# Patient Record
Sex: FEMALE | Race: WHITE | Employment: OTHER | URBAN - METROPOLITAN AREA
[De-identification: names, ages, dates, MRNs, and addresses within clinical notes are randomized per-mention and may not be internally consistent; named-entity substitution may affect disease eponyms.]

---

## 2017-06-14 ENCOUNTER — OFFICE VISIT (OUTPATIENT)
Dept: OBGYN CLINIC | Facility: CLINIC | Age: 52
End: 2017-06-14

## 2017-06-14 VITALS
DIASTOLIC BLOOD PRESSURE: 82 MMHG | SYSTOLIC BLOOD PRESSURE: 118 MMHG | WEIGHT: 156 LBS | HEIGHT: 65 IN | RESPIRATION RATE: 16 BRPM | HEART RATE: 72 BPM | BODY MASS INDEX: 25.99 KG/M2

## 2017-06-14 DIAGNOSIS — Z12.31 ENCOUNTER FOR SCREENING MAMMOGRAM FOR MALIGNANT NEOPLASM OF BREAST: ICD-10-CM

## 2017-06-14 DIAGNOSIS — N39.0 URINARY TRACT INFECTION, SITE UNSPECIFIED: Primary | ICD-10-CM

## 2017-06-14 DIAGNOSIS — Z01.419 ENCOUNTER FOR WELL WOMAN EXAM WITH ROUTINE GYNECOLOGICAL EXAM: ICD-10-CM

## 2017-06-14 PROCEDURE — 87624 HPV HI-RISK TYP POOLED RSLT: CPT | Performed by: OBSTETRICS & GYNECOLOGY

## 2017-06-14 PROCEDURE — 88175 CYTOPATH C/V AUTO FLUID REDO: CPT | Performed by: OBSTETRICS & GYNECOLOGY

## 2017-06-14 PROCEDURE — 87086 URINE CULTURE/COLONY COUNT: CPT | Performed by: OBSTETRICS & GYNECOLOGY

## 2017-06-14 PROCEDURE — 81003 URINALYSIS AUTO W/O SCOPE: CPT | Performed by: OBSTETRICS & GYNECOLOGY

## 2017-06-14 PROCEDURE — 99396 PREV VISIT EST AGE 40-64: CPT | Performed by: OBSTETRICS & GYNECOLOGY

## 2017-06-14 NOTE — PROGRESS NOTES
Yrn Stanford is a 47year old female [id number] Patient's last menstrual period was 11/14/2016. Patient presents with:  Wellness Visit: annual  .  No menses since 11/16, c/o mild hot flashes, declines therapy. Discussed menopause/perimenopause  H/o cone bx vision  Cardiovascular:  denies chest pain or palpitations  Respiratory:  denies shortness of breath  Gastrointestinal:  denies heartburn, abdominal pain, diarrhea or constipation  Genitourinary:  denies dysuria, incontinence, abnormal vaginal discharge, v Risk , Thin Prep Collect; Future  -     anual mammogram; Future  -     CBC With Differential With Platelet; Future  -     Comp Metabolic Panel (14); Future  -     Lipid Panel; Future  -     Assay, Thyroid Stim Hormone;  Future  -     Vitamin D, 25-Hydroxy;

## 2017-08-29 ENCOUNTER — TELEPHONE (OUTPATIENT)
Dept: OBGYN CLINIC | Facility: CLINIC | Age: 52
End: 2017-08-29

## 2017-08-30 NOTE — TELEPHONE ENCOUNTER
Patient states that for the past few months she has been having regular menses and wants to go back on OCP. Patient was on LO Loestrin FE. Will check with Dr. Jaden Cano and call patient back.

## 2017-10-28 ENCOUNTER — NURSE ONLY (OUTPATIENT)
Dept: OBGYN CLINIC | Facility: CLINIC | Age: 52
End: 2017-10-28

## 2017-10-28 DIAGNOSIS — N30.01 ACUTE CYSTITIS WITH HEMATURIA: Primary | ICD-10-CM

## 2017-10-28 PROCEDURE — 81002 URINALYSIS NONAUTO W/O SCOPE: CPT | Performed by: OBSTETRICS & GYNECOLOGY

## 2017-10-28 PROCEDURE — 87086 URINE CULTURE/COLONY COUNT: CPT | Performed by: OBSTETRICS & GYNECOLOGY

## 2017-10-28 PROCEDURE — 87186 SC STD MICRODIL/AGAR DIL: CPT | Performed by: OBSTETRICS & GYNECOLOGY

## 2017-10-28 PROCEDURE — 87077 CULTURE AEROBIC IDENTIFY: CPT | Performed by: OBSTETRICS & GYNECOLOGY

## 2017-10-28 PROCEDURE — 99212 OFFICE O/P EST SF 10 MIN: CPT | Performed by: OBSTETRICS & GYNECOLOGY

## 2017-10-28 RX ORDER — SULFAMETHOXAZOLE AND TRIMETHOPRIM 800; 160 MG/1; MG/1
1 TABLET ORAL 2 TIMES DAILY
Qty: 20 TABLET | Refills: 0 | Status: SHIPPED | OUTPATIENT
Start: 2017-10-28 | End: 2017-11-07

## 2017-11-17 ENCOUNTER — HOSPITAL ENCOUNTER (OUTPATIENT)
Dept: MAMMOGRAPHY | Age: 52
Discharge: HOME | End: 2017-11-17
Attending: OBSTETRICS & GYNECOLOGY
Payer: COMMERCIAL

## 2017-11-17 DIAGNOSIS — Z12.31 ENCOUNTER FOR SCREENING MAMMOGRAM FOR MALIGNANT NEOPLASM OF BREAST: ICD-10-CM

## 2017-11-17 DIAGNOSIS — Z01.419 ENCOUNTER FOR WELL WOMAN EXAM WITH ROUTINE GYNECOLOGICAL EXAM: ICD-10-CM

## 2017-11-17 PROCEDURE — 77067 SCR MAMMO BI INCL CAD: CPT | Performed by: OBSTETRICS & GYNECOLOGY

## 2017-11-21 ENCOUNTER — TELEPHONE (OUTPATIENT)
Dept: OBGYN CLINIC | Facility: CLINIC | Age: 52
End: 2017-11-21

## 2017-11-21 NOTE — TELEPHONE ENCOUNTER
Called and spoke with patient regarding her overdue labs still needing to be done, and pt stated she will come in soon to get the bloodwork done.

## 2019-08-28 ENCOUNTER — OFFICE VISIT (OUTPATIENT)
Dept: OBGYN CLINIC | Facility: CLINIC | Age: 54
End: 2019-08-28
Payer: COMMERCIAL

## 2019-08-28 VITALS
HEIGHT: 65 IN | DIASTOLIC BLOOD PRESSURE: 62 MMHG | BODY MASS INDEX: 24.83 KG/M2 | HEART RATE: 69 BPM | SYSTOLIC BLOOD PRESSURE: 102 MMHG | WEIGHT: 149 LBS

## 2019-08-28 DIAGNOSIS — Z12.39 SCREENING FOR BREAST CANCER: ICD-10-CM

## 2019-08-28 DIAGNOSIS — N95.2 VAGINAL ATROPHY: ICD-10-CM

## 2019-08-28 DIAGNOSIS — N90.89 VULVAR LESION: ICD-10-CM

## 2019-08-28 DIAGNOSIS — Z01.411 ENCOUNTER FOR WELL WOMAN EXAM WITH ABNORMAL FINDINGS: Primary | ICD-10-CM

## 2019-08-28 DIAGNOSIS — R35.0 URINARY FREQUENCY: ICD-10-CM

## 2019-08-28 DIAGNOSIS — N39.3 SUI (STRESS URINARY INCONTINENCE, FEMALE): ICD-10-CM

## 2019-08-28 DIAGNOSIS — N94.10 DYSPAREUNIA IN FEMALE: ICD-10-CM

## 2019-08-28 LAB
APPEARANCE: CLEAR
BILIRUB UR QL STRIP.AUTO: NEGATIVE
CLARITY UR REFRACT.AUTO: CLEAR
COLOR UR AUTO: YELLOW
GLUCOSE UR STRIP.AUTO-MCNC: NEGATIVE MG/DL
KETONES UR STRIP.AUTO-MCNC: NEGATIVE MG/DL
LEUKOCYTE ESTERASE UR QL STRIP.AUTO: NEGATIVE
MULTISTIX LOT#: NORMAL NUMERIC
NITRITE UR QL STRIP.AUTO: NEGATIVE
PH UR STRIP.AUTO: 7 [PH] (ref 4.5–8)
PH, URINE: 7 (ref 4.5–8)
PROT UR STRIP.AUTO-MCNC: NEGATIVE MG/DL
RBC #/AREA URNS AUTO: >10 /HPF
SP GR UR STRIP.AUTO: 1.01 (ref 1–1.03)
SPECIFIC GRAVITY: 1.01 (ref 1–1.03)
URINE-COLOR: YELLOW
UROBILINOGEN UR STRIP.AUTO-MCNC: <2 MG/DL

## 2019-08-28 PROCEDURE — 99213 OFFICE O/P EST LOW 20 MIN: CPT | Performed by: OBSTETRICS & GYNECOLOGY

## 2019-08-28 PROCEDURE — 88175 CYTOPATH C/V AUTO FLUID REDO: CPT | Performed by: OBSTETRICS & GYNECOLOGY

## 2019-08-28 PROCEDURE — 81002 URINALYSIS NONAUTO W/O SCOPE: CPT | Performed by: OBSTETRICS & GYNECOLOGY

## 2019-08-28 PROCEDURE — 99396 PREV VISIT EST AGE 40-64: CPT | Performed by: OBSTETRICS & GYNECOLOGY

## 2019-08-28 PROCEDURE — 87086 URINE CULTURE/COLONY COUNT: CPT | Performed by: OBSTETRICS & GYNECOLOGY

## 2019-08-28 NOTE — H&P
[facility]  Obstetrics and Gynecology   History & Physical  Established Patient    Chief complaint: Patient presents with:  Wellness Visit    Subjective:     HPI: Monty Chaney is a 49year old  with LMP Patient's last menstrual period w 11/08, 05/03    ASC-US HPV +    • Cervical dysplasia     H/o cervical dysplasia, underwent LEEP/Conization.     • E-coli UTI 10/28/2017    Culture proven E.coli UTI   • History of Papanicolaou smear of cervix 07/15, 04/14, 10/12, 10/11, 09/10, 04/07, 04/06, Emotionally abused: Not on file        Physically abused: Not on file        Forced sexual activity: Not on file    Other Topics      Concerns:         Service: Not Asked        Blood Transfusions: Not Asked        Caffeine Concern: No        Occu rhythm. Exam reveals no gallop and no friction rub. No murmur heard. Edema not present. Pulmonary/Chest: Breath sounds normal. She has no wheezes. She has no rales. no palpable masses  Negative nipple discharge or skin changes.  Negative axillary lym recent dyspareunia with vaginal dryness during intercourse.      Patient Active Problem List:     Contusion of left wrist     Acute cystitis with hematuria      Diagnoses and all orders for this visit:    Encounter for well woman exam with abnormal findings vaginal estrogen, Imvexxy 4 mg dose, Rx sent  -reviewed vulvar hygiene, coconut oil, lubricant for all intercourse.      Urinary frequency  -UA in office with moderate blood, will send UA to lab & UCx    Cervical cancer screening   -h/o LEEP in 2009  -ousmane

## 2019-08-30 PROBLEM — N90.89 VULVAR LESION: Status: ACTIVE | Noted: 2019-08-30

## 2019-08-30 PROBLEM — N95.2 VAGINAL ATROPHY: Status: ACTIVE | Noted: 2019-08-30

## 2019-08-30 PROBLEM — N94.10 DYSPAREUNIA IN FEMALE: Status: ACTIVE | Noted: 2019-08-30

## 2019-08-30 PROBLEM — N39.3 SUI (STRESS URINARY INCONTINENCE, FEMALE): Status: ACTIVE | Noted: 2019-08-30

## 2019-08-30 PROBLEM — R35.0 URINARY FREQUENCY: Status: ACTIVE | Noted: 2019-08-30

## 2020-09-14 ENCOUNTER — TELEPHONE (OUTPATIENT)
Dept: OBGYN CLINIC | Facility: CLINIC | Age: 55
End: 2020-09-14

## 2020-09-14 DIAGNOSIS — Z12.31 SCREENING MAMMOGRAM, ENCOUNTER FOR: Primary | ICD-10-CM

## 2020-09-15 NOTE — TELEPHONE ENCOUNTER
Patient aware order placed and routed for signature for her mammogram. CS number given. Patient verbalized understanding.

## 2020-09-28 RX ORDER — ESTRADIOL 4 UG/1
INSERT VAGINAL
Qty: 8 EACH | Refills: 0 | OUTPATIENT
Start: 2020-09-28

## 2020-11-05 ENCOUNTER — OFFICE VISIT (OUTPATIENT)
Dept: OBGYN CLINIC | Facility: CLINIC | Age: 55
End: 2020-11-05
Payer: COMMERCIAL

## 2020-11-05 VITALS
DIASTOLIC BLOOD PRESSURE: 60 MMHG | WEIGHT: 141 LBS | BODY MASS INDEX: 23.49 KG/M2 | SYSTOLIC BLOOD PRESSURE: 120 MMHG | HEIGHT: 65 IN

## 2020-11-05 DIAGNOSIS — Z01.419 ENCOUNTER FOR WELL WOMAN EXAM WITH ROUTINE GYNECOLOGICAL EXAM: Primary | ICD-10-CM

## 2020-11-05 DIAGNOSIS — Z12.4 CERVICAL CANCER SCREENING: ICD-10-CM

## 2020-11-05 PROCEDURE — 81002 URINALYSIS NONAUTO W/O SCOPE: CPT | Performed by: OBSTETRICS & GYNECOLOGY

## 2020-11-05 PROCEDURE — 88175 CYTOPATH C/V AUTO FLUID REDO: CPT | Performed by: OBSTETRICS & GYNECOLOGY

## 2020-11-05 PROCEDURE — 3078F DIAST BP <80 MM HG: CPT | Performed by: OBSTETRICS & GYNECOLOGY

## 2020-11-05 PROCEDURE — 99396 PREV VISIT EST AGE 40-64: CPT | Performed by: OBSTETRICS & GYNECOLOGY

## 2020-11-05 PROCEDURE — 87625 HPV TYPES 16 & 18 ONLY: CPT | Performed by: OBSTETRICS & GYNECOLOGY

## 2020-11-05 PROCEDURE — 87624 HPV HI-RISK TYP POOLED RSLT: CPT | Performed by: OBSTETRICS & GYNECOLOGY

## 2020-11-05 PROCEDURE — 3074F SYST BP LT 130 MM HG: CPT | Performed by: OBSTETRICS & GYNECOLOGY

## 2020-11-05 PROCEDURE — 3008F BODY MASS INDEX DOCD: CPT | Performed by: OBSTETRICS & GYNECOLOGY

## 2020-11-05 RX ORDER — ESTRADIOL 4 UG/1
4 INSERT VAGINAL
Qty: 8 EACH | Refills: 12 | Status: SHIPPED | OUTPATIENT
Start: 2020-11-05 | End: 2021-10-21

## 2020-11-05 NOTE — PROGRESS NOTES
Pilar Nichols is a 48year old female  No LMP recorded. (Menstrual status: Perimenopausal). Patient presents with:  Wellness Visit  .   Patient has no complaints, not sexually active but feels imvexxi helped with vaginal dryness    OBSTETRICS HISTO with a drink rarely. Substance and Sexual Activity      Alcohol use: Yes        Binge frequency: Never        Comment: about 2 times per month.        Drug use: No      Sexual activity: Not Currently    Lifestyle      Physical activity        Days per w Place 4 mcg vaginally twice a week for 28 days. Insert 1 softgel capsule twice weekly. , Disp: 8 each, Rfl: 12  •  Multiple Vitamins-Minerals (MULTIVITAMIN ADULT OR), Take by mouth., Disp: , Rfl:     ALLERGIES:    Macrobid [Nitrofura*    RASH      Review of without masses or tenderness  Perineum: normal  Anus: no hemorroids     Assessment & Plan:  Diagnoses and all orders for this visit:    Encounter for well woman exam with routine gynecological exam  -     CBC WITH DIFFERENTIAL WITH PLATELET;  Future  -

## 2020-11-06 ENCOUNTER — HOSPITAL ENCOUNTER (OUTPATIENT)
Dept: MAMMOGRAPHY | Age: 55
Discharge: HOME | End: 2020-11-06
Attending: OBSTETRICS & GYNECOLOGY
Payer: COMMERCIAL

## 2020-11-06 DIAGNOSIS — Z12.31 SCREENING MAMMOGRAM, ENCOUNTER FOR: ICD-10-CM

## 2020-11-06 PROCEDURE — 77067 SCR MAMMO BI INCL CAD: CPT | Performed by: OBSTETRICS & GYNECOLOGY

## 2020-11-06 PROCEDURE — 77063 BREAST TOMOSYNTHESIS BI: CPT | Performed by: OBSTETRICS & GYNECOLOGY

## 2020-11-07 ENCOUNTER — TELEPHONE (OUTPATIENT)
Dept: OBGYN CLINIC | Facility: CLINIC | Age: 55
End: 2020-11-07

## 2020-11-07 NOTE — TELEPHONE ENCOUNTER
Patient aware of HPV results being positive, patient informed additional testing is still pending (pap)

## 2020-11-09 ENCOUNTER — TELEPHONE (OUTPATIENT)
Dept: OBGYN CLINIC | Facility: CLINIC | Age: 55
End: 2020-11-09

## 2020-11-09 NOTE — TELEPHONE ENCOUNTER
Spoke with patient. Went over Pap and HPV results but still awaiting Genotyping and recommendations. Patient aware and understanding verbalized. Will call her when results and recommendations are available.

## 2020-11-20 NOTE — PROGRESS NOTES
Patient aware of pap smear results and recommendations for repeat pap smear in 1 year.  Patient verbalizes understanding

## 2021-07-22 ENCOUNTER — NURSE ONLY (OUTPATIENT)
Dept: OBGYN CLINIC | Facility: CLINIC | Age: 56
End: 2021-07-22
Payer: COMMERCIAL

## 2021-07-22 ENCOUNTER — TELEPHONE (OUTPATIENT)
Dept: OBGYN CLINIC | Facility: CLINIC | Age: 56
End: 2021-07-22

## 2021-07-22 VITALS
SYSTOLIC BLOOD PRESSURE: 102 MMHG | HEIGHT: 65 IN | HEART RATE: 80 BPM | BODY MASS INDEX: 23.99 KG/M2 | WEIGHT: 144 LBS | DIASTOLIC BLOOD PRESSURE: 66 MMHG

## 2021-07-22 DIAGNOSIS — N30.01 ACUTE CYSTITIS WITH HEMATURIA: Primary | ICD-10-CM

## 2021-07-22 LAB
GLUCOSE (URINE DIPSTICK): NEGATIVE MG/DL
MULTISTIX LOT#: ABNORMAL NUMERIC
NITRITE, URINE: POSITIVE
PH, URINE: 5 (ref 4.5–8)
PROTEIN (URINE DIPSTICK): 100 MG/DL
SPECIFIC GRAVITY: 1.03 (ref 1–1.03)
UROBILINOGEN,SEMI-QN: 1 MG/DL (ref 0–1.9)

## 2021-07-22 PROCEDURE — 3008F BODY MASS INDEX DOCD: CPT | Performed by: OBSTETRICS & GYNECOLOGY

## 2021-07-22 PROCEDURE — 3078F DIAST BP <80 MM HG: CPT | Performed by: OBSTETRICS & GYNECOLOGY

## 2021-07-22 PROCEDURE — 81002 URINALYSIS NONAUTO W/O SCOPE: CPT | Performed by: OBSTETRICS & GYNECOLOGY

## 2021-07-22 PROCEDURE — 87186 SC STD MICRODIL/AGAR DIL: CPT | Performed by: OBSTETRICS & GYNECOLOGY

## 2021-07-22 PROCEDURE — 87088 URINE BACTERIA CULTURE: CPT | Performed by: OBSTETRICS & GYNECOLOGY

## 2021-07-22 PROCEDURE — 3074F SYST BP LT 130 MM HG: CPT | Performed by: OBSTETRICS & GYNECOLOGY

## 2021-07-22 PROCEDURE — 87086 URINE CULTURE/COLONY COUNT: CPT | Performed by: OBSTETRICS & GYNECOLOGY

## 2021-07-22 RX ORDER — SULFAMETHOXAZOLE AND TRIMETHOPRIM 800; 160 MG/1; MG/1
1 TABLET ORAL 2 TIMES DAILY
Qty: 20 TABLET | Refills: 0 | Status: SHIPPED | OUTPATIENT
Start: 2021-07-22 | End: 2021-07-29

## 2021-07-22 NOTE — TELEPHONE ENCOUNTER
Pt scheduled for UTI check today at 2:30. Pt stated she has blood in urine and frequent urination. Pt is current with her wellness. Please advise if this is ok to stay as a nurse visit.

## 2021-07-23 NOTE — PROGRESS NOTES
Kenna Parker,  Your urine culture came back positive for a urinary tract infection. Dr. Godfrey Haro has sent a medication called Bactrim to your pharmacy to treat this infection. Please follow the instructions provided to you by the pharmacist.    Be sure to take your antibiotic with food.      Thanks, Jairon Hassan RN

## 2021-07-26 NOTE — PROGRESS NOTES
Patient aware of urine culture and results. Patient is still taking Bactrim and symptoms have almost resolved. Advised patient call back with any new or worsening symptoms. Patient verbalizes understanding.

## 2021-09-03 ENCOUNTER — TELEPHONE (OUTPATIENT)
Dept: OBGYN CLINIC | Facility: CLINIC | Age: 56
End: 2021-09-03

## 2021-09-03 NOTE — TELEPHONE ENCOUNTER
Patient recently had a UTI and was taking an antibiotic when it was first diagnosed. Once the results of the test came back, a different antibiotic was prescribed that she never took. She has also used an estrogen suppository.   Would an antibiotic be pre

## 2021-09-04 RX ORDER — SULFAMETHOXAZOLE AND TRIMETHOPRIM 800; 160 MG/1; MG/1
1 TABLET ORAL 2 TIMES DAILY
Qty: 14 TABLET | Refills: 0 | Status: SHIPPED | OUTPATIENT
Start: 2021-09-04 | End: 2021-09-11

## 2021-09-04 NOTE — TELEPHONE ENCOUNTER
Patient was in the office on 7/22/21, positive urine culture for e-coli. Patient was treated with antibiotics (Bactrim) but feels like the infection is back. Patient would like to know if antibiotics can be called in to her pharmacy.  She is allergic to Mac

## 2021-09-04 NOTE — TELEPHONE ENCOUNTER
Pt just returned your call. She states she is going out of town and needs the antibiotic because she thinks it never went away? Please advise. She states she can not go to an urgent care facility because she has an allergy. Please call pt.  Thanks

## 2021-10-21 RX ORDER — ESTRADIOL 4 UG/1
4 INSERT VAGINAL
Qty: 8 EACH | Refills: 0 | Status: SHIPPED | OUTPATIENT
Start: 2021-10-21 | End: 2021-11-18

## 2022-02-28 NOTE — TELEPHONE ENCOUNTER
Request for refill received. Pt has not been in office since 11/2020,has appt scheduled for 4/15/22.

## 2022-03-01 ENCOUNTER — OFFICE VISIT (OUTPATIENT)
Dept: FAMILY MEDICINE CLINIC | Facility: CLINIC | Age: 57
End: 2022-03-01
Payer: COMMERCIAL

## 2022-03-01 VITALS
BODY MASS INDEX: 25.78 KG/M2 | DIASTOLIC BLOOD PRESSURE: 72 MMHG | TEMPERATURE: 99 F | HEART RATE: 92 BPM | SYSTOLIC BLOOD PRESSURE: 124 MMHG | WEIGHT: 151 LBS | HEIGHT: 64 IN | OXYGEN SATURATION: 98 %

## 2022-03-01 DIAGNOSIS — L21.0 PITYRIASIS: Primary | ICD-10-CM

## 2022-03-01 PROCEDURE — 3078F DIAST BP <80 MM HG: CPT | Performed by: PHYSICIAN ASSISTANT

## 2022-03-01 PROCEDURE — 99213 OFFICE O/P EST LOW 20 MIN: CPT | Performed by: PHYSICIAN ASSISTANT

## 2022-03-01 PROCEDURE — 3074F SYST BP LT 130 MM HG: CPT | Performed by: PHYSICIAN ASSISTANT

## 2022-03-01 PROCEDURE — 3008F BODY MASS INDEX DOCD: CPT | Performed by: PHYSICIAN ASSISTANT

## 2022-03-01 RX ORDER — ESTRADIOL 4 UG/1
4 INSERT VAGINAL
Qty: 8 EACH | Refills: 1 | Status: SHIPPED | OUTPATIENT
Start: 2022-03-03 | End: 2022-03-31

## 2022-03-01 RX ORDER — METHYLPREDNISOLONE 4 MG/1
TABLET ORAL
Qty: 1 EACH | Refills: 0 | Status: SHIPPED | OUTPATIENT
Start: 2022-03-01

## 2022-04-15 ENCOUNTER — OFFICE VISIT (OUTPATIENT)
Dept: OBGYN CLINIC | Facility: CLINIC | Age: 57
End: 2022-04-15
Payer: COMMERCIAL

## 2022-04-15 VITALS
DIASTOLIC BLOOD PRESSURE: 80 MMHG | SYSTOLIC BLOOD PRESSURE: 110 MMHG | HEART RATE: 72 BPM | HEIGHT: 64 IN | WEIGHT: 150 LBS | BODY MASS INDEX: 25.61 KG/M2

## 2022-04-15 DIAGNOSIS — Z12.4 CERVICAL CANCER SCREENING: ICD-10-CM

## 2022-04-15 DIAGNOSIS — Z01.419 ENCOUNTER FOR WELL WOMAN EXAM WITH ROUTINE GYNECOLOGICAL EXAM: Primary | ICD-10-CM

## 2022-04-15 DIAGNOSIS — Z12.31 BREAST CANCER SCREENING BY MAMMOGRAM: ICD-10-CM

## 2022-04-15 DIAGNOSIS — Z12.11 COLON CANCER SCREENING: ICD-10-CM

## 2022-04-15 PROCEDURE — 87625 HPV TYPES 16 & 18 ONLY: CPT | Performed by: OBSTETRICS & GYNECOLOGY

## 2022-04-15 PROCEDURE — 3074F SYST BP LT 130 MM HG: CPT | Performed by: OBSTETRICS & GYNECOLOGY

## 2022-04-15 PROCEDURE — 99396 PREV VISIT EST AGE 40-64: CPT | Performed by: OBSTETRICS & GYNECOLOGY

## 2022-04-15 PROCEDURE — 3079F DIAST BP 80-89 MM HG: CPT | Performed by: OBSTETRICS & GYNECOLOGY

## 2022-04-15 PROCEDURE — 87624 HPV HI-RISK TYP POOLED RSLT: CPT | Performed by: OBSTETRICS & GYNECOLOGY

## 2022-04-15 PROCEDURE — 3008F BODY MASS INDEX DOCD: CPT | Performed by: OBSTETRICS & GYNECOLOGY

## 2022-04-18 LAB — HPV I/H RISK 1 DNA SPEC QL NAA+PROBE: POSITIVE

## 2022-04-21 LAB
HPV16 DNA CVX QL PROBE+SIG AMP: NEGATIVE
HPV18 DNA CVX QL PROBE+SIG AMP: NEGATIVE

## 2022-06-10 ENCOUNTER — OFFICE VISIT (OUTPATIENT)
Dept: OBGYN CLINIC | Facility: CLINIC | Age: 57
End: 2022-06-10
Payer: COMMERCIAL

## 2022-06-10 VITALS
BODY MASS INDEX: 25.95 KG/M2 | SYSTOLIC BLOOD PRESSURE: 106 MMHG | DIASTOLIC BLOOD PRESSURE: 66 MMHG | HEART RATE: 80 BPM | HEIGHT: 64 IN | WEIGHT: 152 LBS

## 2022-06-10 DIAGNOSIS — Z12.11 COLON CANCER SCREENING: ICD-10-CM

## 2022-06-10 DIAGNOSIS — Z01.812 PRE-PROCEDURAL LABORATORY EXAMINATION: ICD-10-CM

## 2022-06-10 DIAGNOSIS — R87.810 CERVICAL HIGH RISK HUMAN PAPILLOMAVIRUS (HPV) DNA TEST POSITIVE: Primary | ICD-10-CM

## 2022-06-10 LAB
CONTROL LINE PRESENT WITH A CLEAR BACKGROUND (YES/NO): YES YES/NO
PREGNANCY TEST, URINE: NEGATIVE

## 2022-06-10 PROCEDURE — 88305 TISSUE EXAM BY PATHOLOGIST: CPT | Performed by: OBSTETRICS & GYNECOLOGY

## 2022-06-10 NOTE — PROGRESS NOTES
Colpo w/Cx Biopsy and ECC    Pregnancy Results: negative from urine test     Discussed high/low risk HPV, dormant vs active state of the virus, unknown exposure time. Discussed pap smear being a screening test and if abnormal follows by colposcopic examination of the cervix with biopsy. This procedure will provide us with diagnosis of mild,moderate or severe dysplasia. The treatment options cryo vs LEEP discussed in length    Consent signed. Procedure discussed with patient in detail including indication, risk, benefits, alternatives and complications. Pre-Procedure:  Pre-Meds:    Cervix prepped with:  Acetic acid    Procedure:  Under satisfactory analgesia, the patient was put in the dorsal lithotomy position. [place] speculum was placed in the vagina. Under colposcopic examination the transition zone was seen in entirety. ECC done then cervical biopsy taken at 12 o'clock where increased acetowhite changes noted. Patient tolerated procedure well.       Findings:  Acetowhite epithelium    Impression:  HPV changes    Cristina Soliman DO  12:56 PM  6/10/2022

## 2022-08-23 ENCOUNTER — TELEPHONE (OUTPATIENT)
Dept: OBGYN CLINIC | Facility: CLINIC | Age: 57
End: 2022-08-23

## 2022-08-28 ENCOUNTER — TELEPHONE (OUTPATIENT)
Dept: OBGYN CLINIC | Facility: CLINIC | Age: 57
End: 2022-08-28

## 2022-08-28 RX ORDER — SULFAMETHOXAZOLE AND TRIMETHOPRIM 800; 160 MG/1; MG/1
1 TABLET ORAL 2 TIMES DAILY
Qty: 14 TABLET | Refills: 0 | Status: SHIPPED | OUTPATIENT
Start: 2022-08-28 | End: 2022-09-04

## 2022-08-28 NOTE — TELEPHONE ENCOUNTER
Patient called. UTI symptoms. Has hx of UTI. 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 Wants rx called in. Chart reviewed. Had UTI 7/2021 with E coli.  Will rx bactrim

## 2022-09-22 ENCOUNTER — OFFICE VISIT (OUTPATIENT)
Dept: FAMILY MEDICINE CLINIC | Facility: CLINIC | Age: 57
End: 2022-09-22

## 2022-09-22 ENCOUNTER — HOSPITAL ENCOUNTER (OUTPATIENT)
Age: 57
Discharge: HOME | End: 2022-09-22

## 2022-09-22 ENCOUNTER — APPOINTMENT (OUTPATIENT)
Dept: CT IMAGING | Age: 57
End: 2022-09-22
Attending: NURSE PRACTITIONER

## 2022-09-22 VITALS
WEIGHT: 150 LBS | OXYGEN SATURATION: 99 % | TEMPERATURE: 98 F | HEART RATE: 77 BPM | SYSTOLIC BLOOD PRESSURE: 134 MMHG | BODY MASS INDEX: 25.61 KG/M2 | HEIGHT: 64 IN | RESPIRATION RATE: 17 BRPM | DIASTOLIC BLOOD PRESSURE: 89 MMHG

## 2022-09-22 DIAGNOSIS — S09.90XA INJURY OF HEAD, INITIAL ENCOUNTER: Primary | ICD-10-CM

## 2022-09-22 DIAGNOSIS — Z02.9 ADMINISTRATIVE ENCOUNTER: Primary | ICD-10-CM

## 2022-09-22 PROCEDURE — 70450 CT HEAD/BRAIN W/O DYE: CPT | Performed by: NURSE PRACTITIONER

## 2022-09-22 PROCEDURE — 99203 OFFICE O/P NEW LOW 30 MIN: CPT

## 2022-09-22 PROCEDURE — 99214 OFFICE O/P EST MOD 30 MIN: CPT
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
## 2022-10-05 ENCOUNTER — TELEPHONE (OUTPATIENT)
Dept: OBGYN CLINIC | Facility: CLINIC | Age: 57
End: 2022-10-05

## 2022-10-05 NOTE — TELEPHONE ENCOUNTER
Spoke with patient. Explained to her what happened. Message was sent as if she was calling from the patients mothers home. She would like us to just clarify before calling from here on out. Reassured her. Instructed her that it is ok to keep her appointment on 11/28/22. She would like to keep her appointment for 11/28 at 0930 am. Understanding verbalized.

## 2022-10-05 NOTE — TELEPHONE ENCOUNTER
Pt just called, very angry that the nurse called her mother to ask about a Leep procedure. Pt is asking to speak to a Manager TODAY. Pt states we messed up the first time by making her reschedule an appointment due to doctor not being there but pt states the information was incorrect and that the doctor was available. Pt states she was then told to reschedule to November and wanted to make sure that was ok with Dr. Ricky Jeffries. Pt is furious that a nurse then called her mother back and left a message! Pt states she did not want her MOTHER (pt is 64) to be involved/aware of this at all. Pt would like a call from a manager to discuss this.

## 2022-10-07 ENCOUNTER — HOSPITAL ENCOUNTER (OUTPATIENT)
Dept: MAMMOGRAPHY | Age: 57
Discharge: HOME | End: 2022-10-07
Attending: OBSTETRICS & GYNECOLOGY
Payer: COMMERCIAL

## 2022-10-07 DIAGNOSIS — Z12.31 BREAST CANCER SCREENING BY MAMMOGRAM: ICD-10-CM

## 2022-10-07 PROCEDURE — 77067 SCR MAMMO BI INCL CAD: CPT | Performed by: OBSTETRICS & GYNECOLOGY

## 2022-10-07 PROCEDURE — 77063 BREAST TOMOSYNTHESIS BI: CPT | Performed by: OBSTETRICS & GYNECOLOGY

## 2022-11-17 ENCOUNTER — TELEPHONE (OUTPATIENT)
Facility: CLINIC | Age: 57
End: 2022-11-17

## 2022-11-17 NOTE — TELEPHONE ENCOUNTER
LVM for pt informing her that we will no longer be accepting Ambetter as of Jan 2023. Stated pt is ok for her November 28th appointment but that we wanted to let her know for follow up care purposes.

## 2022-11-18 ENCOUNTER — TELEPHONE (OUTPATIENT)
Facility: CLINIC | Age: 57
End: 2022-11-18

## 2022-11-18 NOTE — TELEPHONE ENCOUNTER
Spoke with pt. She has questions on whether or not her insurance will cover the LEEP & post op check since she has Ambetter. I told her if it's all done this year it should cover. I told her I would have our referral coordinator check into it and call her back. Pt to contact the Marketplace to change insurance. Post op appointment made 12/22/22 at 1:30 pm. Will notify pt when I call her back.

## 2022-11-18 NOTE — TELEPHONE ENCOUNTER
Returned pt's call. She signed up for [facility] for next year. She just wanted to let us know.

## 2022-11-18 NOTE — TELEPHONE ENCOUNTER
Spoke with pt. I let her know we checked with Sharon and no authorization is required for an in office LEEP. I also let her know that as long as appointments are in 2022 insurance will cover after speaking with our referral coordinator. Advised pt to check with The [employer] to see what insurance we are in network with. To call with any questions. Informaed post op appointment scheduled 12/22/22 at 1:30. Verbalized understanding.

## 2023-01-11 ENCOUNTER — TELEPHONE (OUTPATIENT)
Facility: CLINIC | Age: 58
End: 2023-01-11

## 2023-01-11 NOTE — TELEPHONE ENCOUNTER
Appt for LEEP was cancelled. Pls advice to pt if procedure can be done outpatient hospital or [id number] [address] to wait longer.  Thank you

## 2023-01-12 NOTE — TELEPHONE ENCOUNTER
Spoke with pt she's looking for an early Friday morning for the Leep  Procedure. we don't have any slots available at this time. Will call pt once something become available.

## 2023-01-13 ENCOUNTER — TELEPHONE (OUTPATIENT)
Facility: CLINIC | Age: 58
End: 2023-01-13

## 2023-01-13 DIAGNOSIS — Z01.812 PRE-PROCEDURAL LABORATORY EXAMINATION: ICD-10-CM

## 2023-01-13 DIAGNOSIS — R87.810 CERVICAL HIGH RISK HUMAN PAPILLOMAVIRUS (HPV) DNA TEST POSITIVE: Primary | ICD-10-CM

## 2023-01-13 NOTE — TELEPHONE ENCOUNTER
Spoke with pt. Currently using Estradiol vaginal inserts. She hasn't been using them lately due to the cost & feels like she gets a UTI when using. She would like to try an oral estrogen if that is an option. I let her know I would get a message to Dr. Ketty Carrera and call with her recommendations. Verbalized understanding.

## 2023-01-13 NOTE — TELEPHONE ENCOUNTER
Pt currently taking Estrogen suppositories. pt stats she keep getting UTI. Is there an over the counter Estrogen  Cream she can take?

## 2023-01-13 NOTE — TELEPHONE ENCOUNTER
Pt aware surgery hs been scheduled for 02/09. Covid order has been placed. Pt verbalized understanding.

## 2023-01-17 NOTE — TELEPHONE ENCOUNTER
RN spoke to patient. HIPAA verified. Patient states she is taking estradiol for thinning, not dryness. Patient is requesting an oral medication. Is there an over the counter option or cost effect prescription? Message route to provider. Patient aware and verbalizes understanding.

## 2023-01-18 ENCOUNTER — TELEPHONE (OUTPATIENT)
Facility: CLINIC | Age: 58
End: 2023-01-18

## 2023-01-18 NOTE — TELEPHONE ENCOUNTER
Spoke with pt she's aware she will need a referral from her PCP in order to get her surgery approved. Pt currently has [facility] HMO ID # [id number].

## 2023-01-20 NOTE — TELEPHONE ENCOUNTER
Spoke with pt. Aware no effective otc medication. Pt will try Prempro. If too expensive will call the office for an alternate medication. Prempro pended. Verbalized understanding.

## 2023-01-23 RX ORDER — ESTROGEN,CON/M-PROGEST ACET 0.3-1.5MG
1 TABLET ORAL DAILY
Qty: 90 TABLET | Refills: 0 | Status: SHIPPED | OUTPATIENT
Start: 2023-01-23

## 2023-01-23 NOTE — TELEPHONE ENCOUNTER
Message left per HIPAA form letting pt know Dr. Gabbi Gomez sent her prescription to her pharmacy. To call with any questions.

## 2023-01-26 ENCOUNTER — TELEPHONE (OUTPATIENT)
Facility: CLINIC | Age: 58
End: 2023-01-26

## 2023-01-26 RX ORDER — ESTROGEN,CON/M-PROGEST ACET 0.3-1.5MG
1 TABLET ORAL DAILY
Qty: 90 TABLET | Refills: 0 | Status: SHIPPED | OUTPATIENT
Start: 2023-01-26

## 2023-01-26 NOTE — TELEPHONE ENCOUNTER
Fax received from [facility] to inform office that Prempro is not available at there pharmacy. Script resend to patient's local pharmacy.

## 2023-01-30 ENCOUNTER — TELEPHONE (OUTPATIENT)
Facility: CLINIC | Age: 58
End: 2023-01-30

## 2023-01-30 NOTE — TELEPHONE ENCOUNTER
Pt has questions re: leep procedure. Spoke with pt she was scheduled for leep procedure at [facility] on 02/09. Due to pt's insurance procedure was canceled. Pt aware leep procedure has been canceled.

## 2023-01-30 NOTE — TELEPHONE ENCOUNTER
RN spoke to patient. HIPAA verified. Patient frustrated with insurance and was given a list on Friday for a PCP. Was told that see need a referral for her leep procedure. States she has not had the chance to find or schedule an appointment yet with a PCP. Instructed patient to find a PCP with her insurance for a referral and call the office when she is scheduled. Patient verbalized understanding and agreed to plan of care.

## 2023-02-02 ENCOUNTER — TELEPHONE (OUTPATIENT)
Facility: CLINIC | Age: 58
End: 2023-02-02

## 2023-02-02 NOTE — TELEPHONE ENCOUNTER
Spoke with pt. Scheduled for a LEEP procedure 3/3/23. She has [facility]. She had to do a virtual visit with a PCP through her [employer]. They told her she does not require a referral for her procedure. They will fax information to our office. If pt receives information through her [employer] she will fax it to us. Advised we are in network with [facility]. Verbalized understanding.

## 2023-02-27 ENCOUNTER — TELEPHONE (OUTPATIENT)
Facility: CLINIC | Age: 58
End: 2023-02-27

## 2023-02-27 NOTE — TELEPHONE ENCOUNTER
Spoke with patient. She states she was not told she would receive anesthesia for her LEEP in the hospital. Originally scheduled for the office but due to equipment issues she was scheduled for the hospital. Scheduled first for 2/9/23, cancelled on 1/30/23 and rescheduled for 3/3/23. She is requesting to cancel again due to not having a ride home from procedure. Priya Shepherd surgery scheduler aware and sent case request to cancel. Patient would like to see Dr Jose L Stover again for her [facility] and reevaluate how soon she would need to have the LEEP. Per notes on 1/11/23 \"OK to wait longer\". WWE scheduled, surgery cancelled per patients request. Understanding verbalized.

## 2023-02-27 NOTE — TELEPHONE ENCOUNTER
Patient has an appointment scheduled for Friday, March 3rd. Was not informed the procedure requires her to be put under anesthesia. Patient is upset and is requesting somebody call her, preferably the .

## 2023-03-20 ENCOUNTER — OFFICE VISIT (OUTPATIENT)
Facility: CLINIC | Age: 58
End: 2023-03-20
Payer: COMMERCIAL

## 2023-03-20 VITALS
WEIGHT: 151.38 LBS | HEIGHT: 64 IN | BODY MASS INDEX: 25.84 KG/M2 | SYSTOLIC BLOOD PRESSURE: 122 MMHG | HEART RATE: 79 BPM | DIASTOLIC BLOOD PRESSURE: 76 MMHG

## 2023-03-20 DIAGNOSIS — Z01.419 ENCOUNTER FOR WELL WOMAN EXAM WITH ROUTINE GYNECOLOGICAL EXAM: Primary | ICD-10-CM

## 2023-03-20 DIAGNOSIS — Z12.4 CERVICAL CANCER SCREENING: ICD-10-CM

## 2023-03-20 DIAGNOSIS — Z12.11 COLON CANCER SCREENING: ICD-10-CM

## 2023-03-20 PROCEDURE — 87624 HPV HI-RISK TYP POOLED RSLT: CPT | Performed by: OBSTETRICS & GYNECOLOGY

## 2023-03-20 RX ORDER — ESTRADIOL 0.1 MG/G
1 CREAM VAGINAL
Qty: 42.5 G | Refills: 2 | Status: SHIPPED | OUTPATIENT
Start: 2023-03-20

## 2023-03-21 LAB — HPV I/H RISK 1 DNA SPEC QL NAA+PROBE: NEGATIVE

## 2023-07-14 ENCOUNTER — TELEPHONE (OUTPATIENT)
Facility: CLINIC | Age: 58
End: 2023-07-14

## 2023-09-13 ENCOUNTER — TELEPHONE (OUTPATIENT)
Facility: CLINIC | Age: 58
End: 2023-09-13

## 2023-09-13 DIAGNOSIS — Z01.419 ENCOUNTER FOR WELL WOMAN EXAM WITH ROUTINE GYNECOLOGICAL EXAM: Primary | ICD-10-CM

## 2023-09-13 DIAGNOSIS — Z12.31 BREAST CANCER SCREENING BY MAMMOGRAM: ICD-10-CM

## 2023-09-15 ENCOUNTER — TELEPHONE (OUTPATIENT)
Facility: CLINIC | Age: 58
End: 2023-09-15

## 2023-09-15 NOTE — TELEPHONE ENCOUNTER
Spoke with pt. She was prescribed Prempro. It is going to cost her over $200/month. She is looking for a cheaper option. She is open to taking 2 separate medications if it would be less expensive. I let her know I would talk to Dr. Brian Zamarripa and call with recommendations. Verbalized understanding.

## 2023-09-20 ENCOUNTER — LAB ENCOUNTER (OUTPATIENT)
Dept: LAB | Age: 58
End: 2023-09-20
Attending: OBSTETRICS & GYNECOLOGY
Payer: COMMERCIAL

## 2023-09-20 PROCEDURE — 82274 ASSAY TEST FOR BLOOD FECAL: CPT | Performed by: OBSTETRICS & GYNECOLOGY

## 2023-09-20 RX ORDER — PROGESTERONE 200 MG/1
200 CAPSULE ORAL NIGHTLY
Qty: 90 CAPSULE | Refills: 0 | Status: SHIPPED | OUTPATIENT
Start: 2023-09-20

## 2023-09-20 RX ORDER — ESTRADIOL 1 MG/1
1 TABLET ORAL DAILY
Qty: 90 TABLET | Refills: 0 | Status: SHIPPED | OUTPATIENT
Start: 2023-09-20

## 2023-09-20 RX ORDER — ESTRADIOL 1 MG/1
1 TABLET ORAL DAILY
Qty: 90 TABLET | Refills: 0 | Status: SHIPPED | OUTPATIENT
Start: 2023-09-20 | End: 2023-09-20

## 2023-09-20 RX ORDER — PROGESTERONE 200 MG/1
200 CAPSULE ORAL NIGHTLY
Qty: 90 CAPSULE | Refills: 0 | Status: SHIPPED | OUTPATIENT
Start: 2023-09-20 | End: 2023-09-20

## 2023-09-20 NOTE — TELEPHONE ENCOUNTER
Pt is requesting the prescriptions be sent to the [address] [place] [address] in [facility].  Will cancel rx previously sent  and pend orders for [place].

## 2023-09-20 NOTE — TELEPHONE ENCOUNTER
Spoke with pt. Aware we will send Estrace 1 mg & progesterone 200 mg to her pharmacy. Orders pended. Verbalized understanding.

## 2023-09-23 LAB — HEMOCCULT STL QL: NEGATIVE

## 2023-11-10 ENCOUNTER — TELEPHONE (OUTPATIENT)
Facility: CLINIC | Age: 58
End: 2023-11-10

## 2023-11-10 NOTE — TELEPHONE ENCOUNTER
Pt states she was told by her pharmacy that there is a national shortage of progesterone 200 MG Oral Cap ; pt would like to know if there is something else that can be prescribed; pls advise.

## 2023-11-10 NOTE — TELEPHONE ENCOUNTER
Spoke with pt. There is a shortage of progesterone capsules and she is unable to get them. Her insurance will not cover Prempro. She is open to going back to the suppositories if necessary until the capsules are available. She only has 2 capsules left so will need something for the weekend. I let her know I would call her back with recommendations. Verbalized understanding.

## 2023-11-13 NOTE — TELEPHONE ENCOUNTER
Pt would like to go back to the progesterone suppositories until 200 mg capsules are available. Will route for recommendations and call pt back.

## 2023-11-15 NOTE — TELEPHONE ENCOUNTER
Spoke with pt. Recently moved to [facility]. They have the progesterone 200 mg caps in stock. Nothing further is needed. Verbalized understanding.

## 2024-04-04 RX ORDER — ESTRADIOL 1 MG/1
1 TABLET ORAL DAILY
Qty: 90 TABLET | Refills: 0 | OUTPATIENT
Start: 2024-04-04

## 2024-04-04 RX ORDER — PROGESTERONE 200 MG/1
200 CAPSULE ORAL AS DIRECTED
Qty: 12 CAPSULE | Refills: 11 | OUTPATIENT
Start: 2024-04-04

## 2024-04-08 RX ORDER — ESTRADIOL 1 MG/1
1 TABLET ORAL DAILY
Qty: 90 TABLET | Refills: 0 | OUTPATIENT
Start: 2024-04-08

## 2024-04-09 NOTE — TELEPHONE ENCOUNTER
Pt scheduled for Wwe online with Dr. LYLE for 6/7 but asked for BC refill to get her through. Please advise.

## 2024-04-09 NOTE — TELEPHONE ENCOUNTER
Last WWE 3/20/23. Scheduled annual exam 6/7/24. Requesting a refill on her Estradiol. Order pended.

## 2024-04-10 RX ORDER — ESTRADIOL 1 MG/1
1 TABLET ORAL DAILY
Qty: 90 TABLET | Refills: 0 | Status: SHIPPED | OUTPATIENT
Start: 2024-04-10 | End: 2024-04-10

## 2024-04-10 NOTE — TELEPHONE ENCOUNTER
Pt aware rx has been sent. She needs it resent to the [facility] in [place]. New order pended. Verbalized understanding.

## 2024-04-12 RX ORDER — ESTRADIOL 1 MG/1
1 TABLET ORAL DAILY
Qty: 90 TABLET | Refills: 0 | Status: SHIPPED | OUTPATIENT
Start: 2024-04-12

## 2024-04-12 NOTE — TELEPHONE ENCOUNTER
Pt called that prescription has not been sent to [facility] [place] - [address] . [phone], [phone], this prescription was routed to the [facility] pharmacy. Pt is currently out of medication and rq to have this corrected today. Please advise.

## 2024-05-06 ENCOUNTER — TELEPHONE (OUTPATIENT)
Facility: CLINIC | Age: 59
End: 2024-05-06

## 2024-06-04 RX ORDER — PROGESTERONE 200 MG/1
200 CAPSULE ORAL DAILY
Qty: 12 CAPSULE | Refills: 10 | OUTPATIENT
Start: 2024-06-04

## 2024-06-05 RX ORDER — PROGESTERONE 200 MG/1
200 CAPSULE ORAL DAILY
Qty: 12 CAPSULE | Refills: 10 | OUTPATIENT
Start: 2024-06-05

## 2024-06-18 ENCOUNTER — TELEPHONE (OUTPATIENT)
Facility: CLINIC | Age: 59
End: 2024-06-18

## 2024-06-18 RX ORDER — PROGESTERONE 200 MG/1
200 CAPSULE ORAL NIGHTLY
Qty: 90 CAPSULE | Refills: 0 | Status: SHIPPED | OUTPATIENT
Start: 2024-06-18

## 2024-06-18 NOTE — TELEPHONE ENCOUNTER
Patient request for Rx progesterone refill to [facility] pharmacy- order placed.   Patient verbalized understanding, agreed to and intend to comply with plan of care.    Well woman exam, scheduled for 07/01/2024

## 2024-06-18 NOTE — TELEPHONE ENCOUNTER
Patient has un-matching amount of estradiol/progesterone. Patient is overdue for well woman exam, scheduled for 07/01/2024. Please advise on medication

## 2025-08-28 ENCOUNTER — TELEPHONE (OUTPATIENT)
Facility: CLINIC | Age: 60
End: 2025-08-28

## 2025-08-28 RX ORDER — ESTRADIOL 1 MG/1
1 TABLET ORAL DAILY
Qty: 90 TABLET | Refills: 5 | Status: SHIPPED | OUTPATIENT
Start: 2025-08-28

## 2025-08-28 RX ORDER — PROGESTERONE 200 MG/1
200 CAPSULE ORAL NIGHTLY
Qty: 90 CAPSULE | Refills: 5 | Status: SHIPPED | OUTPATIENT
Start: 2025-08-28

## (undated) NOTE — MR AVS SNAPSHOT
After Visit Summary   11/5/2020    Anup Bourne    MRN: [id number]           Visit Information     Date & Time  11/5/2020  1:00 PM Provider  Angelo Marc DO Department  [address]  Dept.  Phone  [phone]      Your Vi [facility] now offers Video Visits through [address] for adult and pediatric patients. Video Visits are available Monday - Friday for many common conditions such as allergies, colds, cough, fever, rash, sore throat, headache and pink eye.   The cost for a Video Vi [address]   Monday – Friday  4:00 pm – 10:00 pm   Saturday – Sunday  10:00 am – 4:00 pm  WALK-IN CARE  Emergency Medicine Providers  Conditions needing urgent attention, but are   non-life-threatening.     Also available by appointment Average cost  $120*

## (undated) NOTE — MR AVS SNAPSHOT
Marleny Bhakta  [address] [facility] [address]  [address] [id number]               Thank you for choosing us for your health care visit with [facility], DO.   We are glad to serve you and happy to provide you with this Assoc Dx:  Encounter for well woman exam with routine gynecological exam [D88.658]           Vitamin D, 25-Hydroxy    Complete by:  Jun 14, 2017 (Approximate)    Assoc Dx:  Encounter for well woman exam with routine gynecological exam [R46.560] Multistix Lot# [id number] Numeric    Multistix Expiration Date 01- Date                  [facility]     Sign up for [address] [facility], your secure online medical record.   [address] [facility] will allow you to access patient instructions from your recent visit,  view other heal Start activities slowly and build up over time Do what you like   Get your heart pumping – brisk walking, biking, swimming Even 10 minute increments are effective and add up over the week   2 ½ hours per week – spread out over time Use a taqueria to keep you

## (undated) NOTE — LETTER
[facility] [place] of [place]N  After Care Instructions for Colposcopy/Biopsy      Biopsy Results   You will receive a phone call with your biopsy results in 7 business days. If you have not received your biopsy results in 7 days, please contact our office. Your biopsy results will help the physician decide if and what further treatment is  necessary. Bleeding   After your biopsy, the area is swabbed with a brown solution to help stop any bleeding. For this reason, you may notice a brown or black clotty discharge lasting several days. If you experience bright red bleeding that is heavy, you should call the office. Restrictions    You should avoid douching, intercourse and tampon use for 3 days following your procedure. Pain    If you are uncomfortable after the procedure, you may use Aleve, Tylenol or Ibuprofen for the discomfort. If you have additional questions or concerns, please call us at [phone].